# Patient Record
Sex: MALE | Race: NATIVE HAWAIIAN OR OTHER PACIFIC ISLANDER | HISPANIC OR LATINO | URBAN - METROPOLITAN AREA
[De-identification: names, ages, dates, MRNs, and addresses within clinical notes are randomized per-mention and may not be internally consistent; named-entity substitution may affect disease eponyms.]

---

## 2024-01-10 ENCOUNTER — EMERGENCY (EMERGENCY)
Facility: HOSPITAL | Age: 34
LOS: 1 days | Discharge: HOME | End: 2024-01-10
Attending: EMERGENCY MEDICINE
Payer: COMMERCIAL

## 2024-01-10 VITALS
HEART RATE: 97 BPM | OXYGEN SATURATION: 98 % | DIASTOLIC BLOOD PRESSURE: 98 MMHG | SYSTOLIC BLOOD PRESSURE: 150 MMHG | TEMPERATURE: 98 F

## 2024-01-10 VITALS
TEMPERATURE: 98 F | SYSTOLIC BLOOD PRESSURE: 116 MMHG | RESPIRATION RATE: 16 BRPM | OXYGEN SATURATION: 97 % | HEART RATE: 93 BPM | DIASTOLIC BLOOD PRESSURE: 76 MMHG

## 2024-01-10 PROCEDURE — 99284 EMERGENCY DEPT VISIT MOD MDM: CPT

## 2024-01-10 PROCEDURE — 99283 EMERGENCY DEPT VISIT LOW MDM: CPT | Mod: 25

## 2024-01-10 PROCEDURE — 71046 X-RAY EXAM CHEST 2 VIEWS: CPT

## 2024-01-10 PROCEDURE — 71046 X-RAY EXAM CHEST 2 VIEWS: CPT | Mod: 26

## 2024-01-10 RX ORDER — ACETAMINOPHEN 500 MG
975 TABLET ORAL ONCE
Refills: 0 | Status: COMPLETED | OUTPATIENT
Start: 2024-01-10 | End: 2024-01-10

## 2024-01-10 RX ORDER — IBUPROFEN 200 MG
600 TABLET ORAL ONCE
Refills: 0 | Status: COMPLETED | OUTPATIENT
Start: 2024-01-10 | End: 2024-01-10

## 2024-01-10 RX ORDER — LIDOCAINE 4 G/100G
1 CREAM TOPICAL ONCE
Refills: 0 | Status: COMPLETED | OUTPATIENT
Start: 2024-01-10 | End: 2024-01-10

## 2024-01-10 RX ADMIN — LIDOCAINE 1 PATCH: 4 CREAM TOPICAL at 22:11

## 2024-01-10 RX ADMIN — Medication 600 MILLIGRAM(S): at 22:12

## 2024-01-10 RX ADMIN — Medication 975 MILLIGRAM(S): at 22:12
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
## 2024-01-10 NOTE — ED PROVIDER NOTE - NSFOLLOWUPINSTRUCTIONS_ED_ALL_ED_FT
1) Please follow-up with your Primary Medical Doctor in 2-3 days. If you do not have a primary doctor, please call the Physician Referral Service. If you have trouble making an appointment inform the office that you were recently seen in the Emergency Department and it is an urgent matter. Bring a copy of your results with you to your appointment.  2) Return to the Emergency Department for persistent, worsening, or new symptoms, or if you experience fever, chills, chest pain, shortness of breath, dizziness, fainting, abdominal pain, nausea or vomiting, inability to eat or drink, difficulty with urination, numbness, weakness, or inability to walk safely.   3) Please call the [facility] for further evaluation and treatment, within 1-2 weeks. Their phone number is [phone]. 1) Please follow-up with your Primary Medical Doctor in 2-3 days. If you do not have a primary doctor, please call the Physician Referral Service. If you have trouble making an appointment inform the office that you were recently seen in the Emergency Department and it is an urgent matter. Bring a copy of your results with you to your appointment.  2) Return to the Emergency Department for persistent, worsening, or new symptoms, or if you experience fever, chills, chest pain, shortness of breath, dizziness, fainting, abdominal pain, nausea or vomiting, inability to eat or drink, difficulty with urination, numbness, weakness, or inability to walk safely.   3) Please call the [facility] for further evaluation and treatment, within 1-2 weeks. Their phone number is [phone].

## 2024-01-10 NOTE — ED ADULT NURSE NOTE - NSFALLUNIVINTERV_ED_ALL_ED
Bed/Stretcher in lowest position, wheels locked, appropriate side rails in place/Call bell, personal items and telephone in reach/Instruct patient to call for assistance before getting out of bed/chair/stretcher/Non-slip footwear applied when patient is off stretcher/[place] to call system/Physically safe environment - no spills, clutter or unnecessary equipment/Purposeful proactive rounding/Room/bathroom lighting operational, light cord in reach Bed/Stretcher in lowest position, wheels locked, appropriate side rails in place/Call bell, personal items and telephone in reach/Instruct patient to call for assistance before getting out of bed/chair/stretcher/Non-slip footwear applied when patient is off stretcher/[place] to call system/Physically safe environment - no spills, clutter or unnecessary equipment/Purposeful proactive rounding/Room/bathroom lighting operational, light cord in reach

## 2024-01-10 NOTE — ED PROVIDER NOTE - PATIENT PORTAL LINK FT
You can access the FollowMyHealth Patient Portal offered by [facility] by registering at the following website: [email or web address] By joining [employer]’s FollowMyHealth portal, you will also be able to view your health information using other applications (apps) compatible with our system. You can access the FollowMyHealth Patient Portal offered by [facility] by registering at the following website: [email or web address]. By joining [employer]’s FollowMyHealth portal, you will also be able to view your health information using other applications (apps) compatible with our system.
